# Patient Record
Sex: MALE | Race: OTHER | HISPANIC OR LATINO
[De-identification: names, ages, dates, MRNs, and addresses within clinical notes are randomized per-mention and may not be internally consistent; named-entity substitution may affect disease eponyms.]

---

## 2021-04-06 PROBLEM — Z00.00 ENCOUNTER FOR PREVENTIVE HEALTH EXAMINATION: Status: ACTIVE | Noted: 2021-04-06

## 2021-04-08 ENCOUNTER — APPOINTMENT (OUTPATIENT)
Dept: SURGERY | Facility: CLINIC | Age: 46
End: 2021-04-08
Payer: MEDICAID

## 2021-04-08 VITALS
DIASTOLIC BLOOD PRESSURE: 70 MMHG | HEIGHT: 65 IN | WEIGHT: 170 LBS | BODY MASS INDEX: 28.32 KG/M2 | SYSTOLIC BLOOD PRESSURE: 116 MMHG | HEART RATE: 75 BPM

## 2021-04-08 VITALS — TEMPERATURE: 93.7 F

## 2021-04-08 DIAGNOSIS — Z78.9 OTHER SPECIFIED HEALTH STATUS: ICD-10-CM

## 2021-04-08 DIAGNOSIS — K80.20 CALCULUS OF GALLBLADDER W/OUT CHOLECYSTITIS W/OUT OBSTRUCTION: ICD-10-CM

## 2021-04-08 DIAGNOSIS — E78.9 DISORDER OF LIPOPROTEIN METABOLISM, UNSPECIFIED: ICD-10-CM

## 2021-04-08 PROCEDURE — 99072 ADDL SUPL MATRL&STAF TM PHE: CPT

## 2021-04-08 PROCEDURE — 99203 OFFICE O/P NEW LOW 30 MIN: CPT

## 2021-04-08 RX ORDER — ATORVASTATIN CALCIUM 10 MG/1
10 TABLET, FILM COATED ORAL
Refills: 0 | Status: ACTIVE | COMMUNITY

## 2021-04-08 RX ORDER — MELOXICAM 15 MG/1
15 TABLET ORAL
Refills: 0 | Status: ACTIVE | COMMUNITY

## 2021-04-08 NOTE — PHYSICAL EXAM
[Abdominal Masses] : No abdominal masses [Abdomen Tenderness] : ~T ~M No abdominal tenderness [Alert] : alert [Oriented to Person] : oriented to person [Oriented to Place] : oriented to place [Oriented to Time] : oriented to time [Calm] : calm [de-identified] : He  is alert, well-groomed, and in NAD\par   [de-identified] : anicteric.  Nasal mucosa pink, septum midline. Oral mucosa pink.  Tongue midline, Pharynx without exudates.\par   [de-identified] : Neck supple. Trachea midline. Thyroid isthmus barely palpable, lobes not felt.\par

## 2021-04-08 NOTE — PLAN
[FreeTextEntry1] : Patient is with symptomatic cholelithiasis.   Patient is very hesitant to pursue any surgical intervention.  Given presence of gallstones, laparoscopic, possibly open cholecystectomy was discussed. Patient does not wish to pursue surgery at this time. he has an appointment scheduled with the GI at the end of April and will decide after that. Patient instructed to maintain a fat-free diet, and to seek immediate medical attention with any acute change or worsening of symptoms, including but not limited to abdominal pain, fever, chills, nausea, vomiting, or yellowing of the skin. Patient’s questions and concerns addressed to patient’s satisfaction.  Patient verbalized an understanding of the information discussed. \par \par

## 2021-04-08 NOTE — CONSULT LETTER
[Dear  ___] : Dear  [unfilled], [Consult Letter:] : I had the pleasure of evaluating your patient, [unfilled]. [Please see my note below.] : Please see my note below. [Consult Closing:] : Thank you very much for allowing me to participate in the care of this patient.  If you have any questions, please do not hesitate to contact me. [Sincerely,] : Sincerely, [FreeTextEntry3] : Tommy Webster MD, FACS

## 2021-04-08 NOTE — HISTORY OF PRESENT ILLNESS
[de-identified] : Mr. JOSE MOREL is a 45 year  old patient who was referred by BRYCE Mueller with the chief complaint of having right upper quadrant and epigastric pain for for many years. Pain is non-radiating.   He reports no nausea or vomiting and no history of jaundice, acholia or choluria. he reports frequent abdominal bloating and discomfort. patient reports history of intestinal parasites.  Appetite is good and weight is stable.   He   has no family history of biliary tract disease.  He had an abdominal sonogram on  03/13/2021 which revealed GB stones. CBD is normal \par \par

## 2021-07-30 ENCOUNTER — OUTPATIENT (OUTPATIENT)
Dept: OUTPATIENT SERVICES | Facility: HOSPITAL | Age: 46
LOS: 1 days | End: 2021-07-30
Payer: MEDICAID

## 2021-07-30 VITALS
RESPIRATION RATE: 18 BRPM | WEIGHT: 160.06 LBS | SYSTOLIC BLOOD PRESSURE: 108 MMHG | HEART RATE: 61 BPM | HEIGHT: 66 IN | OXYGEN SATURATION: 99 % | DIASTOLIC BLOOD PRESSURE: 66 MMHG | TEMPERATURE: 98 F

## 2021-07-30 DIAGNOSIS — K81.9 CHOLECYSTITIS, UNSPECIFIED: ICD-10-CM

## 2021-07-30 DIAGNOSIS — Z01.812 ENCOUNTER FOR PREPROCEDURAL LABORATORY EXAMINATION: ICD-10-CM

## 2021-07-30 DIAGNOSIS — Z29.9 ENCOUNTER FOR PROPHYLACTIC MEASURES, UNSPECIFIED: ICD-10-CM

## 2021-07-30 LAB — BLD GP AB SCN SERPL QL: SIGNIFICANT CHANGE UP

## 2021-07-30 PROCEDURE — 36415 COLL VENOUS BLD VENIPUNCTURE: CPT

## 2021-07-30 PROCEDURE — 86900 BLOOD TYPING SEROLOGIC ABO: CPT

## 2021-07-30 PROCEDURE — 86850 RBC ANTIBODY SCREEN: CPT

## 2021-07-30 PROCEDURE — 86901 BLOOD TYPING SEROLOGIC RH(D): CPT

## 2021-07-30 PROCEDURE — G0463: CPT

## 2021-07-30 NOTE — H&P PST ADULT - ASSESSMENT
46 years old male with PMHx of HLD, GERD and PSHx of right knee arthroscopy presented for presurgical evaluation for laparoscopic cholecystectomy with intra-operative cholangiogram possible open on 8/4/2021

## 2021-07-30 NOTE — H&P PST ADULT - NSICDXFAMILYHX_GEN_ALL_CORE_FT
FAMILY HISTORY:  Father  Still living? No  Family history of diabetes mellitus (DM), Age at diagnosis: Age Unknown    Mother  Still living? Yes, Estimated age: Age Unknown  Family history of diabetes mellitus (DM), Age at diagnosis: Age Unknown  FH: HTN (hypertension), Age at diagnosis: Age Unknown  FH: hyperlipidemia, Age at diagnosis: Age Unknown

## 2021-07-30 NOTE — H&P PST ADULT - GASTROINTESTINAL DETAILS
normal/soft/nontender/no distention/no masses palpable/bowel sounds normal/no bruit/no rebound tenderness normal/soft/no distention/no masses palpable/bowel sounds normal/no bruit/no rebound tenderness

## 2021-07-30 NOTE — H&P PST ADULT - NEGATIVE GENERAL GENITOURINARY SYMPTOMS
no renal colic/no flank pain L/no flank pain R/no urine discoloration/no gas in urine/no urinary hesitancy/normal urinary frequency

## 2021-07-30 NOTE — H&P PST ADULT - HISTORY OF PRESENT ILLNESS
HLD, Gerd  knee arthroscopy right 46 years old male with PMHx of HLD, GERD and PSHx of right knee arthroscopy presented for presurgical evaluation for laparoscopic cholecystectomy with intra-operative cholangiogram possible open on 8/4/2021

## 2021-07-30 NOTE — H&P PST ADULT - NSICDXPROBLEM_GEN_ALL_CORE_FT
PROBLEM DIAGNOSES  Problem: Cholecystitis, unspecified  Assessment and Plan: patient is scheduled for laparoscopic cholecystectomy with intra-operative chalngiogram possible open on 8/4/2021    Problem: Prophylactic measure  Assessment and Plan:  Clorhexidine 4% bottle is given to the patient. Written and verbal instructions in regards to chlorhexidine shower the morning of surgery are given to the pt. Patient verbalized understanding.

## 2021-07-30 NOTE — H&P PST ADULT - NEGATIVE GASTROINTESTINAL SYMPTOMS
no nausea/no vomiting/no diarrhea/no constipation/no change in bowel habits/no flatulence/no abdominal pain no nausea/no vomiting/no diarrhea/no constipation/no change in bowel habits/no flatulence

## 2021-07-30 NOTE — H&P PST ADULT - REASON FOR ADMISSION
"I have been having a lot of epigastric pain for last three months, was told I have gallbladder stones "

## 2021-08-02 RX ORDER — SODIUM CHLORIDE 9 MG/ML
3 INJECTION INTRAMUSCULAR; INTRAVENOUS; SUBCUTANEOUS EVERY 8 HOURS
Refills: 0 | Status: DISCONTINUED | OUTPATIENT
Start: 2021-08-04 | End: 2021-08-11

## 2021-08-03 ENCOUNTER — TRANSCRIPTION ENCOUNTER (OUTPATIENT)
Age: 46
End: 2021-08-03

## 2021-08-04 ENCOUNTER — RESULT REVIEW (OUTPATIENT)
Age: 46
End: 2021-08-04

## 2021-08-04 ENCOUNTER — OUTPATIENT (OUTPATIENT)
Dept: OUTPATIENT SERVICES | Facility: HOSPITAL | Age: 46
LOS: 1 days | End: 2021-08-04
Payer: MEDICAID

## 2021-08-04 ENCOUNTER — APPOINTMENT (OUTPATIENT)
Dept: SURGERY | Facility: HOSPITAL | Age: 46
End: 2021-08-04
Payer: MEDICAID

## 2021-08-04 VITALS
SYSTOLIC BLOOD PRESSURE: 112 MMHG | DIASTOLIC BLOOD PRESSURE: 67 MMHG | RESPIRATION RATE: 16 BRPM | HEART RATE: 77 BPM | TEMPERATURE: 98 F | OXYGEN SATURATION: 98 %

## 2021-08-04 VITALS
DIASTOLIC BLOOD PRESSURE: 57 MMHG | WEIGHT: 160.06 LBS | TEMPERATURE: 98 F | HEIGHT: 66 IN | RESPIRATION RATE: 16 BRPM | HEART RATE: 63 BPM | OXYGEN SATURATION: 99 % | SYSTOLIC BLOOD PRESSURE: 109 MMHG

## 2021-08-04 DIAGNOSIS — K81.9 CHOLECYSTITIS, UNSPECIFIED: ICD-10-CM

## 2021-08-04 DIAGNOSIS — Z98.890 OTHER SPECIFIED POSTPROCEDURAL STATES: Chronic | ICD-10-CM

## 2021-08-04 LAB — BLD GP AB SCN SERPL QL: SIGNIFICANT CHANGE UP

## 2021-08-04 PROCEDURE — 36415 COLL VENOUS BLD VENIPUNCTURE: CPT

## 2021-08-04 PROCEDURE — 86850 RBC ANTIBODY SCREEN: CPT

## 2021-08-04 PROCEDURE — 86900 BLOOD TYPING SEROLOGIC ABO: CPT

## 2021-08-04 PROCEDURE — 47563 LAPARO CHOLECYSTECTOMY/GRAPH: CPT

## 2021-08-04 PROCEDURE — 88304 TISSUE EXAM BY PATHOLOGIST: CPT

## 2021-08-04 PROCEDURE — 86901 BLOOD TYPING SEROLOGIC RH(D): CPT

## 2021-08-04 PROCEDURE — 88304 TISSUE EXAM BY PATHOLOGIST: CPT | Mod: 26

## 2021-08-04 PROCEDURE — 47563 LAPARO CHOLECYSTECTOMY/GRAPH: CPT | Mod: AS

## 2021-08-04 PROCEDURE — 76000 FLUOROSCOPY <1 HR PHYS/QHP: CPT

## 2021-08-04 RX ORDER — PANTOPRAZOLE SODIUM 20 MG/1
1 TABLET, DELAYED RELEASE ORAL
Qty: 0 | Refills: 0 | DISCHARGE

## 2021-08-04 RX ORDER — ROSUVASTATIN CALCIUM 5 MG/1
1 TABLET ORAL
Qty: 0 | Refills: 0 | DISCHARGE

## 2021-08-04 RX ORDER — OXYCODONE AND ACETAMINOPHEN 5; 325 MG/1; MG/1
1 TABLET ORAL EVERY 6 HOURS
Refills: 0 | Status: DISCONTINUED | OUTPATIENT
Start: 2021-08-04 | End: 2021-08-04

## 2021-08-04 RX ORDER — ONDANSETRON 8 MG/1
4 TABLET, FILM COATED ORAL EVERY 6 HOURS
Refills: 0 | Status: DISCONTINUED | OUTPATIENT
Start: 2021-08-04 | End: 2021-08-11

## 2021-08-04 RX ORDER — METOCLOPRAMIDE HCL 10 MG
10 TABLET ORAL ONCE
Refills: 0 | Status: DISCONTINUED | OUTPATIENT
Start: 2021-08-04 | End: 2021-08-04

## 2021-08-04 RX ORDER — FENTANYL CITRATE 50 UG/ML
25 INJECTION INTRAVENOUS
Refills: 0 | Status: DISCONTINUED | OUTPATIENT
Start: 2021-08-04 | End: 2021-08-04

## 2021-08-04 RX ORDER — FENTANYL CITRATE 50 UG/ML
50 INJECTION INTRAVENOUS
Refills: 0 | Status: DISCONTINUED | OUTPATIENT
Start: 2021-08-04 | End: 2021-08-04

## 2021-08-04 RX ORDER — OXYCODONE HYDROCHLORIDE 5 MG/1
1 TABLET ORAL
Qty: 12 | Refills: 0
Start: 2021-08-04

## 2021-08-04 RX ADMIN — FENTANYL CITRATE 50 MICROGRAM(S): 50 INJECTION INTRAVENOUS at 15:27

## 2021-08-04 RX ADMIN — OXYCODONE AND ACETAMINOPHEN 1 TABLET(S): 5; 325 TABLET ORAL at 16:05

## 2021-08-04 RX ADMIN — OXYCODONE AND ACETAMINOPHEN 1 TABLET(S): 5; 325 TABLET ORAL at 16:35

## 2021-08-04 RX ADMIN — FENTANYL CITRATE 50 MICROGRAM(S): 50 INJECTION INTRAVENOUS at 15:12

## 2021-08-04 NOTE — ASU DISCHARGE PLAN (ADULT/PEDIATRIC) - CARE PROVIDER_API CALL
Tommy Webster)  Surgery  [address] [facility], [place] [address]  Phone: [phone]  Fax: [phone]  Follow Up Time:

## 2021-08-06 PROBLEM — K21.9 GASTRO-ESOPHAGEAL REFLUX DISEASE WITHOUT ESOPHAGITIS: Chronic | Status: ACTIVE | Noted: 2021-08-04

## 2021-08-06 PROBLEM — E78.5 HYPERLIPIDEMIA, UNSPECIFIED: Chronic | Status: ACTIVE | Noted: 2021-08-04

## 2021-08-09 LAB — SURGICAL PATHOLOGY STUDY: SIGNIFICANT CHANGE UP

## 2021-08-16 ENCOUNTER — APPOINTMENT (OUTPATIENT)
Dept: SURGERY | Facility: CLINIC | Age: 46
End: 2021-08-16
Payer: MEDICAID

## 2021-08-16 VITALS — TEMPERATURE: 96.8 F

## 2021-08-16 DIAGNOSIS — K81.9 CHOLECYSTITIS, UNSPECIFIED: ICD-10-CM

## 2021-08-16 PROCEDURE — 99024 POSTOP FOLLOW-UP VISIT: CPT

## 2021-08-16 NOTE — DATA REVIEWED
[FreeTextEntry1] : JOSE MOREL                           2\par \par \par \par Surgical Final Report\par \par \par \par \par Final Diagnosis\par Gallbladder; laparoscopic cholecystectomy:\par Chronic cholecystitis and cholelithiasis\par \par Verified by: Mitra Galicia M.D.\par (Electronic Signature)\par Reported on: 08/09/21 13:26 EDT, [facility],\par [id number] [facility], [place] [address]\par Phone: [phone]   Fax: [phone]\par _________________________________________________________________\par \par Clinical History\par Cholecystitis\par \par Specimen(s) Submitted\par 1     Gallbladder

## 2021-08-16 NOTE — PHYSICAL EXAM
[Calm] : calm [de-identified] : He  is alert, well-groomed, and in NAD\par   [de-identified] : Surgical wounds are  healing well.   no signs of  inflammation or infection.

## 2021-08-16 NOTE — HISTORY OF PRESENT ILLNESS
[de-identified] : Mr. MOREL  is s/p laparoscopic  cholecystectomy  on 08/04/2021. Today Mr. MOREL offers no complaints. patient reports no fever, chills,  or  pain. His  surgical wounds are  healing well. No signs of inflammation, infection or exudate. Patient reports good bowel movements and appetite.

## 2021-08-16 NOTE — PLAN
[FreeTextEntry1] : Mr. MOREL will follow up  if needed. Warning signs, follow up, and restrictions were discussed with the patient.

## 2021-08-16 NOTE — ASSESSMENT
[FreeTextEntry1] : Mr. MOREL is doing well, with excellent post-operative recovery. All surgical incisions are healing well and as expected. There is no evidence of infection or complication, and he is progressing as expected. Post-operative wound care, activity, restrictions and precautions reinforced. Patient instructed to refrain from any heavy lifting greater than 10-15 pounds for at least 4 weeks post-operatively. pathology results were discussed in details.  Patient's questions and concerns addressed to patient's satisfaction.